# Patient Record
Sex: MALE | Race: WHITE | NOT HISPANIC OR LATINO | Employment: FULL TIME | URBAN - METROPOLITAN AREA
[De-identification: names, ages, dates, MRNs, and addresses within clinical notes are randomized per-mention and may not be internally consistent; named-entity substitution may affect disease eponyms.]

---

## 2017-07-24 ENCOUNTER — ALLSCRIPTS OFFICE VISIT (OUTPATIENT)
Dept: OTHER | Facility: OTHER | Age: 22
End: 2017-07-24

## 2017-07-29 ENCOUNTER — GENERIC CONVERSION - ENCOUNTER (OUTPATIENT)
Dept: OTHER | Facility: OTHER | Age: 22
End: 2017-07-29

## 2017-07-29 LAB
A/G RATIO (HISTORICAL): 2.4 (ref 1.2–2.2)
ALBUMIN SERPL BCP-MCNC: 4.3 G/DL (ref 3.5–5.5)
ALP SERPL-CCNC: 65 IU/L (ref 39–117)
ALT SERPL W P-5'-P-CCNC: 20 IU/L (ref 0–44)
AST SERPL W P-5'-P-CCNC: 18 IU/L (ref 0–40)
BASOPHILS # BLD AUTO: 0 %
BASOPHILS # BLD AUTO: 0 X10E3/UL (ref 0–0.2)
BILIRUB SERPL-MCNC: <0.2 MG/DL (ref 0–1.2)
BUN SERPL-MCNC: 13 MG/DL (ref 6–20)
BUN/CREA RATIO (HISTORICAL): 17 (ref 9–20)
CALCIUM SERPL-MCNC: 9 MG/DL (ref 8.7–10.2)
CHLORIDE SERPL-SCNC: 103 MMOL/L (ref 96–106)
CHOLEST SERPL-MCNC: 173 MG/DL (ref 100–199)
CO2 SERPL-SCNC: 25 MMOL/L (ref 18–29)
CREAT SERPL-MCNC: 0.77 MG/DL (ref 0.76–1.27)
DEPRECATED RDW RBC AUTO: 13.4 % (ref 12.3–15.4)
EGFR AFRICAN AMERICAN (HISTORICAL): 150 ML/MIN/1.73
EGFR-AMERICAN CALC (HISTORICAL): 130 ML/MIN/1.73
EOSINOPHIL # BLD AUTO: 0.1 X10E3/UL (ref 0–0.4)
EOSINOPHIL # BLD AUTO: 2 %
ERYTHROCYTE SEDIMENTATION RATE (HISTORICAL): 2 MM/HR (ref 0–15)
GLUCOSE SERPL-MCNC: 87 MG/DL (ref 65–99)
HCT VFR BLD AUTO: 39.1 % (ref 37.5–51)
HGB BLD-MCNC: 13.3 G/DL (ref 12.6–17.7)
IMM.GRANULOCYTES (CD4/8) (HISTORICAL): 0 %
IMM.GRANULOCYTES (CD4/8) (HISTORICAL): 0 X10E3/UL (ref 0–0.1)
LYMPHOCYTES # BLD AUTO: 2.1 X10E3/UL (ref 0.7–3.1)
LYMPHOCYTES # BLD AUTO: 33 %
MCH RBC QN AUTO: 29.6 PG (ref 26.6–33)
MCHC RBC AUTO-ENTMCNC: 34 G/DL (ref 31.5–35.7)
MCV RBC AUTO: 87 FL (ref 79–97)
MONOCYTES # BLD AUTO: 0.6 X10E3/UL (ref 0.1–0.9)
MONOCYTES (HISTORICAL): 10 %
NEUTROPHILS # BLD AUTO: 3.6 X10E3/UL (ref 1.4–7)
NEUTROPHILS # BLD AUTO: 55 %
PLATELET # BLD AUTO: 233 X10E3/UL (ref 150–379)
POTASSIUM SERPL-SCNC: 4.5 MMOL/L (ref 3.5–5.2)
RBC (HISTORICAL): 4.5 X10E6/UL (ref 4.14–5.8)
SODIUM SERPL-SCNC: 143 MMOL/L (ref 134–144)
TOT. GLOBULIN, SERUM (HISTORICAL): 1.8 G/DL (ref 1.5–4.5)
TOTAL PROTEIN (HISTORICAL): 6.1 G/DL (ref 6–8.5)
WBC # BLD AUTO: 6.5 X10E3/UL (ref 3.4–10.8)

## 2017-07-30 LAB — TSH SERPL DL<=0.05 MIU/L-ACNC: 2.54 UIU/ML (ref 0.45–4.5)

## 2017-08-04 ENCOUNTER — GENERIC CONVERSION - ENCOUNTER (OUTPATIENT)
Dept: OTHER | Facility: OTHER | Age: 22
End: 2017-08-04

## 2018-01-11 NOTE — MISCELLANEOUS
Message   Recorded as Task   Date: 09/12/2016 11:55 AM, Created By: Yuliet Atkinson   Task Name: Call Back   Assigned To: Gurmeet Dorsey   Regarding Patient: Brian Dugan, Status: Active   Comment:    Yuliet Atkinson - 12 Sep 2016 11:55 AM     TASK CREATED  Caller: sdaie, Mother; Other; [phone]  pt s mom would like to speak to you ,she is concerned that he is depressed     I spoke to patients mother offered an appmt and discussed other options they are pursung psych eval      Signatures   Electronically signed by : TC Richard ; Sep 12 2016 12:32PM EST                       (Author)

## 2018-01-13 VITALS
SYSTOLIC BLOOD PRESSURE: 120 MMHG | OXYGEN SATURATION: 98 % | HEIGHT: 68 IN | DIASTOLIC BLOOD PRESSURE: 70 MMHG | WEIGHT: 218.44 LBS | HEART RATE: 105 BPM | RESPIRATION RATE: 18 BRPM | BODY MASS INDEX: 33.11 KG/M2

## 2018-01-17 NOTE — MISCELLANEOUS
Message  I called patient at cell [phone] and left message re nl labs instructions for f/u      Signatures   Electronically signed by : TC Valladares ; Aug  4 2017  1:30PM EST                       (Author)

## 2018-01-17 NOTE — PROGRESS NOTES
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)   6  BMI 33 0-33 9,adult (V85 33) (Z68 33)    Plan  Depression    · *VB-Depression Screening; Status:Resulted - Requires Verification,Retrospective By  Protocol Authorization;   Done: [id number] 07:10PM  Depression, History of mononucleosis, Night sweats    · Follow-up visit in 3 months Evaluation and Treatment  Follow-up  Status: Hold For -  Scheduling  Requested for: 04Iiw8336  History of mononucleosis    · (1) CBC/PLT/DIFF; Status:Active; Requested for:16Odv2692;    · (1) CHOLESTEROL, TOTAL; Status:Active; Requested for:18Ysi6535;    · (1) COMPREHENSIVE METABOLIC PANEL; Status:Active; Requested for:65Olw8054; History of mononucleosis, Night sweats    · (1) TSH; Status:Active; Requested for:41Ifs9066;   Night sweats    · (1) SED RATE; Status:Active; Requested for:28Odd6117; Unlinked    · Doxy-Caps 100 MG CAPS    Discussion/Summary    Will ck labs discussed importance of sobriety ck temp get records from urgent care cont present meds and f/u with psych instructions  Chief Complaint  CPE      History of Present Illness  HPI: Patient seen for physical had recently been diagnosed with mono had been having sore throat swollen glands and night sweats presently feeling better overall still a little tired is walking dogs is presently on meds by psychiatry diagnoses anxiety depression adhd although had been treated for bipolar in the past has noted very restless sleep and sweating since Jan no change in meds recent alcohol and drug use but says he is in sobriety at present has not had bloodwork recently ha gained weight      Review of Systems    Constitutional: as noted in HPI  Eyes: No complaints of eye pain, no red eyes, no discharge from eyes, no itchy eyes     ENT: no complaints of earache, no hearing loss, no nosebleeds, no nasal discharge, no sore throat, no hoarseness  Cardiovascular: No complaints of slow heart rate, no fast heart rate, no chest pain, no palpitations, no leg claudication, no lower extremity  Respiratory: No complaints of shortness of breath, no wheezing, no cough, no SOB on exertion, no orthopnea or PND  Gastrointestinal: No complaints of abdominal pain, no constipation, no nausea or vomiting, no diarrhea or bloody stools  Genitourinary: No complaints of dysuria, no incontinence, no hesitancy, no nocturia, no genital lesion, no testicular pain  Musculoskeletal: No complaints of arthralgia, no myalgias, no joint swelling or stiffness, no limb pain or swelling  Integumentary: No complaints of skin rash or skin lesions, no itching, no skin wound, no dry skin  Neurological: No compliants of headache, no confusion, no convulsions, no numbness or tingling, no dizziness or fainting, no limb weakness, no difficulty walking  Psychiatric: as noted in HPI  Endocrine: No complaints of proptosis, no hot flashes, no muscle weakness, no erectile dysfunction, no deepening of the voice, no feelings of weakness  Hematologic/Lymphatic: as noted in HPI  Active Problems    1  Aspiration pneumonia due to vomit, unspecified laterality, unspecified part of lung (507 0)   (J69 0)   2  Need for Menactra vaccination (V03 89) (Z23)   3  Need for Tdap vaccination (V06 1) (Z23)   4   Suicide attempt by drug ingestion, initial encounter (977 9,E950 5) (T50 902A)    Past Medical History    · History of Acute upper respiratory infection (465 9) (J06 9)   · History of Depression (311) (F32 9)   · History of nail disorders (V13 3) (Z87 2)   · 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)   · History of Overweight (278 02) (E66 3)   · History of Suicide attempt by substance overdose, initial encounter (989 9,E950 9)  (T65 92XA)   · History of Transient disorder of initiating or maintaining sleep (307 41) (F51 02)   · History of Weight Loss (On Exam) (783 21)    Family History  Mother    · No pertinent family history  Family History    · Family history of Arthritis (V17 7)   · Family history of Diabetes Mellitus (V18 0)   · Family history of No Significant Family History   · Family history of Reported Family History Of Cancer   · Family history of Reported Family History Of Heart Disease   · Family history of Stroke Syndrome (V17 1)    Social History    · Current Every Day Smoker (305 1)   · Never A Smoker   · Never Drank Alcohol   · Never Used Drugs    Current Meds   1  Concerta 18 MG Oral Tablet Extended Release; Therapy: (Recorded:48Blx5151) to Recorded   2  Doxy-Caps 100 MG CAPS; Therapy: (Recorded:11Aug2016) to Recorded   3  Trileptal 300 MG Oral Tablet; Therapy: ((19) 1614 1318) to Recorded   4  Vyvanse 60 MG Oral Capsule; Therapy: (Recorded:57Njh9311) to Recorded    Allergies    1  Zosyn SOLN    2  No Known Latex Allergies    Vitals   Recorded: 37Wzk8438 05:28PM   Heart Rate 105   Respiration 18   Systolic 871   Diastolic 70   Height 5 ft 7 5 in   Weight 218 lb 7 oz   BMI Calculated 33 71   BSA Calculated 2 11   O2 Saturation 98     Physical Exam    Constitutional   General appearance: No acute distress, well appearing and well nourished  Head and Face   Head and face: Normal     Eyes   Conjunctiva and lids: No erythema, swelling or discharge  Ears, Nose, Mouth, and Throat   External inspection of ears and nose: Normal     Otoscopic examination: Tympanic membranes translucent with normal light reflex  Canals patent without erythema  Oropharynx: Normal with no erythema, edema, exudate or lesions  Neck   Neck: Supple, symmetric, trachea midline, no masses  Thyroid: Normal, no thyromegaly  Pulmonary   Auscultation of lungs: Clear to auscultation  Cardiovascular   Auscultation of heart: Abnormal   The heart rate was tachycardic  Carotid pulses: 2+ bilaterally  Peripheral vascular exam: Normal     Examination of extremities for edema and/or varicosities: Normal     Lymphatic   Palpation of lymph nodes in neck: No lymphadenopathy  Musculoskeletal neg  Skin   Skin and subcutaneous tissue: Normal without rashes or lesions  Neurologic no focsl findings  Psychiatric   Judgment and insight: Normal     Orientation to person, place and time: Normal     Recent and remote memory: Intact  Mood and affect: Abnormal   Mood and Affect: anxious and concerned  Results/Data  PHQ-9 Adult Depression Screening 75Qbp5115 07:10PM User, Finisar     Test Name Result Flag Reference   PHQ-9 Adult Depression Score 11     Over the last two weeks, how often have you been bothered by any of the following problems? Little interest or pleasure in doing things: Several days - 1  Feeling down, depressed, or hopeless: Several days - 1  Trouble falling or staying asleep, or sleeping too much: More than half the days - 2  Feeling tired or having little energy: More than half the days - 2  Poor appetite or over eating: More than half the days - 2  Feeling bad about yourself - or that you are a failure or have let yourself or your family down: Several days - 1  Trouble concentrating on things, such as reading the newspaper or watching television: Several days - 1  Moving or speaking so slowly that other people could have noticed   Or the opposite -  being so fidgety or restless that you have been moving around a lot more than usual: Several days - 1  Thoughts that you would be better off dead, or of hurting yourself in some way: Not at all - 0   PHQ-9 Adult Depression Screening Positive     PHQ-9 Difficulty Level Somewhat difficult     PHQ-9 Severity Moderate Depression       *VB-Depression Screening 54PGL0366 07:10PM [facility]     Test Name Result Flag Reference   Depression Scale Result      Depression Screen - Positive Findings                         Signatures   Electronically signed by TC Ji ; Jul 24 2017  7:32PM EST                       (Author)

## 2020-07-06 ENCOUNTER — TELEPHONE (OUTPATIENT)
Dept: BEHAVIORAL/MENTAL HEALTH CLINIC | Facility: CLINIC | Age: 25
End: 2020-07-06

## 2020-07-07 DIAGNOSIS — F33.42 MAJOR DEPRESSIVE DISORDER, RECURRENT, IN FULL REMISSION (HCC): Primary | ICD-10-CM

## 2020-07-07 PROBLEM — F32.A DEPRESSION: Status: ACTIVE | Noted: 2017-07-24

## 2020-07-07 PROBLEM — F19.11 SUBSTANCE ABUSE IN REMISSION (HCC): Status: ACTIVE | Noted: 2017-07-24

## 2020-07-07 RX ORDER — BUPROPION HYDROCHLORIDE 300 MG/1
300 TABLET ORAL DAILY
Qty: 10 TABLET | Refills: 0 | Status: SHIPPED | OUTPATIENT
Start: 2020-07-07 | End: 2020-07-13 | Stop reason: SDUPTHER

## 2020-07-12 ENCOUNTER — OFFICE VISIT (OUTPATIENT)
Dept: URGENT CARE | Facility: CLINIC | Age: 25
End: 2020-07-12
Payer: COMMERCIAL

## 2020-07-12 VITALS
HEIGHT: 69 IN | RESPIRATION RATE: 18 BRPM | BODY MASS INDEX: 31.1 KG/M2 | TEMPERATURE: 98.9 F | OXYGEN SATURATION: 95 % | WEIGHT: 210 LBS | SYSTOLIC BLOOD PRESSURE: 130 MMHG | DIASTOLIC BLOOD PRESSURE: 80 MMHG | HEART RATE: 78 BPM

## 2020-07-12 DIAGNOSIS — Z20.822 ENCOUNTER FOR LABORATORY TESTING FOR COVID-19 VIRUS: Primary | ICD-10-CM

## 2020-07-12 PROCEDURE — U0003 INFECTIOUS AGENT DETECTION BY NUCLEIC ACID (DNA OR RNA); SEVERE ACUTE RESPIRATORY SYNDROME CORONAVIRUS 2 (SARS-COV-2) (CORONAVIRUS DISEASE [COVID-19]), AMPLIFIED PROBE TECHNIQUE, MAKING USE OF HIGH THROUGHPUT TECHNOLOGIES AS DESCRIBED BY CMS-2020-01-R: HCPCS | Performed by: NURSE PRACTITIONER

## 2020-07-12 PROCEDURE — 99212 OFFICE O/P EST SF 10 MIN: CPT | Performed by: NURSE PRACTITIONER

## 2020-07-12 NOTE — LETTER
July 12, 2020     Patient: Kary Gil   YOB: 1995   Date of Visit: 7/12/2020       To Whom it May Concern:    Kary Gil was seen in my clinic on 7/12/2020  He may return to work on when test is resulted       If you have any questions or concerns, please don't hesitate to call           Sincerely,        BRETT Garcia      CC: No Recipients

## 2020-07-12 NOTE — PROGRESS NOTES
[facility]        NAME: Saundra Hanna is a 26 y o  male  : 1995    MRN: [id number]  DATE: 2020  TIME: 2:38 PM    Assessment and Plan   Encounter for laboratory testing for COVID-19 virus [Z11 59]  1  Encounter for laboratory testing for COVID-19 virus  MISC COVID-19 TEST- Office Collection         Patient Instructions   Self quarantine until you have received your tests results  Tylenol as needed for pain   Increase fluid intake   Follow up with your PCP   Proceed to the ER for worsening symptoms   Results may take 7-10 days  Please check Mychart for your results  Follow up with PCP in 3-5 days  Proceed to  ER if symptoms worsen  Chief Complaint     Chief Complaint   Patient presents with    Shortness of Breath     x 3 5 days, covid swab         History of Present Illness       Patient is a 28-year-old male presenting with a 4 day history of shortness of breath, dry cough, sneezing, and body aches  He has had generalized abdominal pain and diarrhea for the past several weeks  Denies fever, chills, or loss of taste  He did travel to [place] 2 weeks ago  He denies any known confirmed COVID exposure  No over-the-counter medications for symptomatic relief  Review of Systems   Review of Systems   Constitutional: Negative for activity change, chills and fever  HENT: Positive for sneezing  Negative for congestion, drooling, rhinorrhea and sinus pain  Respiratory: Positive for cough and shortness of breath  Gastrointestinal: Positive for abdominal pain and diarrhea  Negative for nausea and vomiting  Musculoskeletal: Positive for myalgias  Skin: Negative for rash  Neurological: Negative for headaches           Current Medications       Current Outpatient Medications:     buPROPion (WELLBUTRIN XL) 300 mg 24 hr tablet, Take 1 tablet (300 mg total) by mouth daily for 10 days, Disp: 10 tablet, Rfl: 0    Current Allergies     Allergies as of 2020 - Reviewed 2020 Allergen Reaction Noted    Nuts  07/12/2020    Piperacillin sod-tazobactam so  03/17/2014            The following portions of the patient's history were reviewed and updated as appropriate: allergies, current medications, past family history, past medical history, past social history, past surgical history and problem list      Past Medical History:   Diagnosis Date    Depression        Past Surgical History:   Procedure Laterality Date    TONSILLECTOMY         No family history on file  Medications have been verified  Objective   /80   Pulse 78   Temp 98 9 °F (37 2 °C)   Resp 18   Ht 5' 9" (1 753 m)   Wt 95 3 kg (210 lb)   SpO2 95%   BMI 31 01 kg/m²        Physical Exam     Physical Exam   Constitutional: He is oriented to person, place, and time  Vital signs are normal  He appears well-developed and well-nourished  He is active  He does not appear ill  HENT:   Head: Normocephalic  Right Ear: Hearing normal    Left Ear: Hearing normal    Nose: Nose normal    Cardiovascular: Normal rate, regular rhythm, S1 normal, S2 normal and normal heart sounds  Pulmonary/Chest: Effort normal and breath sounds normal  No respiratory distress  He has no decreased breath sounds  He has no wheezes  He has no rhonchi  He has no rales  Neurological: He is alert and oriented to person, place, and time  He is not disoriented  Skin: Skin is warm and dry

## 2020-07-12 NOTE — PATIENT INSTRUCTIONS
Self quarantine until you have received your tests results  Tylenol as needed for pain   Increase fluid intake   Follow up with your PCP   Proceed to the ER for worsening symptoms   Results may take 7-10 days  Please check Mychart for your results  Novel Coronavirus   WHAT YOU NEED TO KNOW:   The nCoV is a new strain (type) of coronavirus that can cause severe respiratory (lung) infection  DISCHARGE INSTRUCTIONS:   Call your local emergency number (911 in the [address] [facility]) for any of the following:  Tell the  you may have nCoV  This will help anyone in the ambulance stay safe, and to call ahead to the hospital   · You have sudden shortness of breath  · You have a fast heartbeat or chest pain  Return to the emergency department if:   · You feel so dizzy that you have trouble standing up  · Your lips and fingernails turn blue  Call your doctor if:   · You have a fever over 102ºF (39ºC)  · Your sore throat gets worse or you see white or yellow spots in your throat  · Your symptoms get worse after 3 to 5 days or your cold is not better in 14 days  · You have a rash anywhere on your skin  · You have large, tender lumps in your neck  · You have thick, green, or yellow drainage from your nose  · You cough up thick yellow, green, or bloody mucus  · You are vomiting for more than 24 hours and cannot keep fluids down  · You have a bad earache  · You have questions or concerns about your condition or care  Medicines: You may need any of the following:  · Decongestants  help reduce nasal congestion and help you breathe more easily  If you take decongestant pills, they may make you feel restless or cause problems with your sleep  Do not use decongestant sprays for more than a few days  · Cough suppressants  help reduce coughing  Ask your healthcare provider which type of cough medicine is best for you  · NSAIDs , such as ibuprofen, help decrease swelling, pain, and fever  NSAIDs can cause stomach bleeding or kidney problems in certain people  If you take blood thinner medicine, always ask your healthcare provider if NSAIDs are safe for you  Always read the medicine label and follow directions  · Acetaminophen  decreases pain and fever  It is available without a doctor's order  Ask how much to take and how often to take it  Follow directions  Read the labels of all other medicines you are using to see if they also contain acetaminophen, or ask your doctor or pharmacist  Acetaminophen can cause liver damage if not taken correctly  Do not use more than 4 grams (4,000 milligrams) total of acetaminophen in one day  · Take your medicine as directed  Contact your healthcare provider if you think your medicine is not helping or if you have side effects  Tell him or her if you are allergic to any medicine  Keep a list of the medicines, vitamins, and herbs you take  Include the amounts, and when and why you take them  Bring the list or the pill bottles to follow-up visits  Carry your medicine list with you in case of an emergency  Help relieve mild symptoms:   · Drink more liquids as directed  Liquids will help thin and loosen mucus so you can cough it up  Liquids will also help prevent dehydration  Liquids that help prevent dehydration include water, fruit juice, and broth  Do not drink liquids that contain caffeine  Caffeine can increase your risk for dehydration  Ask your healthcare provider how much liquid to drink each day  · Soothe a sore throat  Gargle with warm salt water  This helps your sore throat feel better  Make salt water by dissolving ¼ teaspoon salt in 1 cup warm water  You may also suck on hard candy or throat lozenges  You may use a sore throat spray  · Use a humidifier or vaporizer  Use a cool mist humidifier or a vaporizer to increase air moisture in your home  This may make it easier for you to breathe and help decrease your cough      · Use saline nasal drops as directed  These help relieve congestion  · Apply petroleum-based jelly around the outside of your nostrils  This can decrease irritation from blowing your nose  · Do not smoke  Nicotine and other chemicals in cigarettes and cigars can make your symptoms worse  They can also cause infections such as bronchitis or pneumonia  Ask your healthcare provider for information if you currently smoke and need help to quit  E-cigarettes or smokeless tobacco still contain nicotine  Talk to your healthcare provider before you use these products  Prevent the spread of nCoV:  If you are around anyone who has nCoV, the following can help you protect you from infection  Have the person follow the guidelines to prevent infecting others:  · Limit close contact with others  Anyone with nCoV should stay in a different room, or sleep in a separate bed  Others need to stay at least 6 feet (2 meters) away  The person should only go out of the house for medical appointments  He or she should always call the office of any healthcare provider  The provider will need to prepare to keep others safe  · Wear a medical mask when around others  You can wear a medical mask or have the person wear one  A medical mask will help prevent nCoV from spreading  · Cover your mouth and nose to sneeze or cough  Everyone should always cover a sneeze or cough  Use a tissue that covers the mouth and nose  Use the bend of our arm if a tissue is not available  Throw the tissue away in a trash can right away  Then wash your hands well with soap and water  Use hand  that contains alcohol if you cannot wash your hands  · Wash your hands often  You and everyone in your home must wash your hands throughout the day  Use soap and water  Use germ-killing gel if soap and water are not available  A person with nCoV should not touch his or her eyes or mouth without washing his or her hands first          · Do not share items  Do not share dishes, towels, or other items with anyone  Always wash items after a person who has nCoV uses them  · Clean surfaces often  Use household  or bleach diluted with water to clean counters, doorknobs, toilet seats, and other surfaces  · Do not handle live animals  The nCoV may be spread to animals, including pets  Until more is known, it is best for a person with nCoV not to touch, play with, or handle live animals  Follow up with your doctor as directed:  Write down your questions so you remember to ask them during your visits  © Copyright [address] Information is for End User's use only and may not be sold, redistributed or otherwise used for commercial purposes  All illustrations and images included in CareNotes® are the copyrighted property of [employer]  or [facility] [place]   The above information is an  only  It is not intended as medical advice for individual conditions or treatments  Talk to your doctor, nurse or pharmacist before following any medical regimen to see if it is safe and effective for you

## 2020-07-13 ENCOUNTER — TELEMEDICINE (OUTPATIENT)
Dept: PSYCHIATRY | Facility: CLINIC | Age: 25
End: 2020-07-13
Payer: COMMERCIAL

## 2020-07-13 DIAGNOSIS — F33.42 MAJOR DEPRESSIVE DISORDER, RECURRENT, IN FULL REMISSION (HCC): Primary | ICD-10-CM

## 2020-07-13 PROCEDURE — 99214 OFFICE O/P EST MOD 30 MIN: CPT | Performed by: NURSE PRACTITIONER

## 2020-07-13 RX ORDER — BUPROPION HYDROCHLORIDE 300 MG/1
300 TABLET ORAL DAILY
Qty: 90 TABLET | Refills: 0 | Status: CANCELLED | OUTPATIENT
Start: 2020-07-13 | End: 2020-10-11

## 2020-07-13 RX ORDER — BUPROPION HYDROCHLORIDE 300 MG/1
300 TABLET ORAL DAILY
Qty: 90 TABLET | Refills: 0 | Status: SHIPPED | OUTPATIENT
Start: 2020-07-13

## 2020-07-13 NOTE — PSYCH
Psychiatric Progress Note   07/13/20     Dina Baliey 26 y o  male MRN: [id number]   @ Encounter: [id number]    Visit Diagnosis:   Encounter Diagnosis     ICD-10-CM    1  Major depressive disorder, recurrent, in [facility] F33 42         Virtual Visit yes  Due to COVID-19 precautions and [facility] emergency, with patient's consent appointment was by phone Spoke with pt  today for medication management  Pt reports taking medication as prescribed, denies side effects, reports stable positive response  Psychiatric status since the last visit: unchanged  Current Outpatient Medications:     buPROPion (WELLBUTRIN XL) 300 mg 24 hr tablet, Take 1 tablet (300 mg total) by mouth daily for 10 days, Disp: 10 tablet, Rfl: 0    Sleep: normal  Appetite: normal  Medication side effects: No   ROS: N/A  Vitals Taken no  There were no vitals filed for this visit  Laboratory results reviewed: no    Progress Toward Goals: stable, positive response to medication    Assessment        Suicide/Homicide Risk Assessment: See mental Status Exam Below    Mental Status Evaluation:  Appearance:  televisit, not assessed   Behavior:  normal, pleasant, cooperative, calm   Speech:  normal rate, decreased volume   Mood:  normal   Affect:  televisit, not assessed   Thought Process:  goal directed   Associations: intact associations   Thought Content:  normal   Perceptual Disturbances: none   Risk Potential: Suicidal ideation - None  Homicidal ideation - None  Potential for aggression - No   Sensorium:  oriented to person, place and time/date   Memory:  recent and remote memory grossly intact   Consciousness:  alert and awake   Attention/Concentration: attention span and concentration are age appropriate   Insight:  good   Judgment: good       Recommended Treatment:     Planned medication and treatment changes:     All current active medications have been reviewed  Continue current dose of bupropion  mg qAM      Risks / Benefits of Treatment:    Risks, benefits, and possible side effects of medications explained to patient and patient verbalizes understanding and agreement for treatment  Counseling / Coordination of Care:    Medications, treatment progress and treatment plan reviewed with patient      MARIA T Love 07/13/20

## 2020-07-14 LAB — SARS-COV-2 RNA SPEC QL NAA+PROBE: NOT DETECTED
